# Patient Record
Sex: FEMALE
[De-identification: names, ages, dates, MRNs, and addresses within clinical notes are randomized per-mention and may not be internally consistent; named-entity substitution may affect disease eponyms.]

---

## 2020-08-25 ENCOUNTER — HOSPITAL ENCOUNTER (OUTPATIENT)
Dept: HOSPITAL 5 - OR | Age: 60
Discharge: HOME | End: 2020-08-25
Attending: UROLOGY
Payer: MEDICAID

## 2020-08-25 VITALS — SYSTOLIC BLOOD PRESSURE: 142 MMHG | DIASTOLIC BLOOD PRESSURE: 79 MMHG

## 2020-08-25 DIAGNOSIS — G62.9: ICD-10-CM

## 2020-08-25 DIAGNOSIS — G47.30: ICD-10-CM

## 2020-08-25 DIAGNOSIS — F17.210: ICD-10-CM

## 2020-08-25 DIAGNOSIS — E11.42: ICD-10-CM

## 2020-08-25 DIAGNOSIS — K21.9: ICD-10-CM

## 2020-08-25 DIAGNOSIS — Z86.73: ICD-10-CM

## 2020-08-25 DIAGNOSIS — N32.89: ICD-10-CM

## 2020-08-25 DIAGNOSIS — R31.9: Primary | ICD-10-CM

## 2020-08-25 DIAGNOSIS — Z98.890: ICD-10-CM

## 2020-08-25 DIAGNOSIS — Z90.710: ICD-10-CM

## 2020-08-25 DIAGNOSIS — Z85.828: ICD-10-CM

## 2020-08-25 DIAGNOSIS — Z79.899: ICD-10-CM

## 2020-08-25 DIAGNOSIS — Z80.59: ICD-10-CM

## 2020-08-25 DIAGNOSIS — E78.00: ICD-10-CM

## 2020-08-25 DIAGNOSIS — F32.9: ICD-10-CM

## 2020-08-25 DIAGNOSIS — Z80.3: ICD-10-CM

## 2020-08-25 DIAGNOSIS — I10: ICD-10-CM

## 2020-08-25 DIAGNOSIS — Z80.8: ICD-10-CM

## 2020-08-25 PROCEDURE — C1769 GUIDE WIRE: HCPCS

## 2020-08-25 PROCEDURE — 88305 TISSUE EXAM BY PATHOLOGIST: CPT

## 2020-08-25 PROCEDURE — 84132 ASSAY OF SERUM POTASSIUM: CPT

## 2020-08-25 PROCEDURE — 74176 CT ABD & PELVIS W/O CONTRAST: CPT

## 2020-08-25 PROCEDURE — 74420 UROGRAPHY RTRGR +-KUB: CPT

## 2020-08-25 PROCEDURE — 82962 GLUCOSE BLOOD TEST: CPT

## 2020-08-25 PROCEDURE — 36415 COLL VENOUS BLD VENIPUNCTURE: CPT

## 2020-08-25 PROCEDURE — 88112 CYTOPATH CELL ENHANCE TECH: CPT

## 2020-08-25 PROCEDURE — 52204 CYSTOSCOPY W/BIOPSY(S): CPT

## 2020-08-25 RX ADMIN — HYDROMORPHONE HYDROCHLORIDE PRN MG: 1 INJECTION, SOLUTION INTRAMUSCULAR; INTRAVENOUS; SUBCUTANEOUS at 10:00

## 2020-08-25 RX ADMIN — FENTANYL CITRATE PRN MCG: 50 INJECTION, SOLUTION INTRAMUSCULAR; INTRAVENOUS at 09:35

## 2020-08-25 RX ADMIN — HYDROMORPHONE HYDROCHLORIDE PRN MG: 1 INJECTION, SOLUTION INTRAMUSCULAR; INTRAVENOUS; SUBCUTANEOUS at 09:52

## 2020-08-25 RX ADMIN — FENTANYL CITRATE PRN MCG: 50 INJECTION, SOLUTION INTRAMUSCULAR; INTRAVENOUS at 09:45

## 2020-08-25 NOTE — CAT SCAN REPORT
CT ABDOMEN AND PELVIS WITHOUT CONTRAST



INDICATION / CLINICAL INFORMATION:

hematuria.



TECHNIQUE:

Axial CT images were obtained through the abdomen and pelvis without IV contrast.  All CT scans at [facility] are performed using CT dose reduction for ALARA by means of automated exposure control. 



COMPARISON:

None available.



FINDINGS:



LOWER CHEST: Bibasilar atelectasis and scarring.

LIVER: No significant abnormality.

GALLBLADDER: No significant abnormality.  

BILE DUCTS: No significant abnormality.

PANCREAS: No significant abnormality.

SPLEEN: No significant abnormality.

ADRENALS: Bilateral hypoattenuating adrenal nodules measuring 2.5 cm on the left and 1.8 cm on the ri
ght.

RIGHT KIDNEY / URETER: No significant abnormality. No evidence of nephroureterolithiasis or obstructi
ve uropathy.

LEFT KIDNEY / URETER: No significant abnormality. No evidence of nephroureterolithiasis or obstructiv
e uropathy.



STOMACH / SMALL BOWEL: No significant abnormality. 

COLON: Sigmoid diverticulosis without evidence of inflammation. 

APPENDIX: No significant abnormality.  

PERITONEUM: No free fluid. No free air. No fluid collection.

LYMPH NODES: No significant adenopathy.

AORTA / ARTERIES: Mild atherosclerotic calcification without acute abnormality. 

IVC / VEINS: No significant abnormality.



URINARY BLADDER: Contrast is seen within the bladder along with a single droplet of gas. These findin
gs are likely related to recent intervention.

REPRODUCTIVE ORGANS: No significant abnormality.



ADDITIONAL FINDINGS: None.



SKELETAL SYSTEM: No significant abnormality.



IMPRESSION:

1. No acute abdominopelvic abnormality. Specifically, no evidence of nephroureterolithiasis or obstru
ctive uropathy.

2. Contrast and gas are seen within the urinary bladder lumen from recent urologic intervention.

3. Bilateral hypoattenuating adrenal nodules likely reflect benign adenomas. If there is clinical con
cern, multiphase adrenal protocol CT could be performed.

4. Uncomplicated sigmoid diverticulosis.



Signer Name: Jonathan Germain MD 

Signed: 8/25/2020 11:33 AM

Workstation Name: [id number]

## 2020-08-25 NOTE — POST OPERATIVE NOTE
Date of procedure: 08/25/20


Pre-op diagnosis: hematuria 


Post-op diagnosis: same


Findings: 





neg cysto 


Procedure: 





cysto bx rpgs 


Anesthesia: GETA


Surgeon: EILEEN MUNOZ


Estimated blood loss: none


Pathology: none


Specimen disposition: to lab (bladder)


Condition: stable


Disposition: PACU

## 2020-08-25 NOTE — FLUOROSCOPY REPORT
FL retrograde urography



INDICATION / CLINICAL INFORMATION:

HEMATURIA.



COMPARISON:

None available.



FINDINGS:

Bilateral retrograde examinations performed





Fluoroscopy time: 25 seconds. Fluoroscopic images: 6.



Signer Name: Shree Bender MD 

Signed: 8/25/2020 2:55 PM

Workstation Name: [id number]

## 2020-08-25 NOTE — DISCHARGE SUMMARY
Short Stay Discharge Plan


Activity: other (no straining )


Weight Bearing Status: Full Weight Bearing


Diet: regular, low fat, low cholesterol


Special Instructions: other (inc fluids )


Follow up with: 


COCO HAMILTON MD [Primary Care Provider] - 7 Days


EILEEN MUNOZ MD [Staff Physician] - 14 Days

## 2020-08-25 NOTE — OPERATIVE REPORT
PREOPERATIVE DIAGNOSIS:  Hematuria.



POSTOPERATIVE DIAGNOSIS:  Hematuria.



PROCEDURE:  Cystoscopy, retrograde biopsy.



SURGEON:  Dr. Saini.



ANESTHESIA:  General.



FINDINGS:  This is a woman with hematuria.  She now presents for cystoscopy. 

All risks and implications discussed.



DESCRIPTION OF PROCEDURE:  The patient was brought to the operating room and

placed on the operating table.  Following induction of anesthesia, she was

placed in lithotomy position, prepped and draped in usual sterile fashion.  Exam

was unremarkable.  Bimanual cystoscopy showed no lesions.  Biopsy was done. 

Retrograde showed delicate collecting system with no persistent filling defect. 

The patient tolerated the procedure well and brought to recovery room in stable

condition for a CT scan, which she did not have.  She was supposed to go for,

but she will have it before discharge.





DD: 08/25/2020 08:41

DT: 08/25/2020 08:50

JOB# [id number]  [id number]

BEATRICE/JACKI

## 2020-08-25 NOTE — ANESTHESIA CONSULTATION
Anesthesia Consult and Med Hx


Date of service: 08/25/20





- Airway


Anesthetic Teeth Evaluation: Poor, Chipped


ROM Head & Neck: Adequate


Mental/Hyoid Distance: Adequate


Mallampati Class: Class II


Intubation Access Assessment: Good





- Pulmonary Exam


CTA: Yes





- Cardiac Exam


Cardiac Exam: RRR





- Pre-Operative Health Status


ASA Pre-Surgery Classification: ASA3


Proposed Anesthetic Plan: General





- Pulmonary


Hx Smoking: Yes (1PPD)


Hx Sleep Apnea: Yes





- Cardiovascular System


Hx Hypertension: Yes (SINCE HER 30'S)





- Central Nervous System


CVA: Yes (2005; NO DEFICITS)


Hx Psychiatric Problems: Yes





- Gastrointestinal


Hx Gastroesophageal Reflux Disease: Yes (controlled with med.)





- Endocrine


Hx Non-Insulin Dependent Diabetes: Yes





- Other Systems


Hx Alcohol Use: No


Hx Substance Use: Yes (MARIJUANA)


Hx Cancer: Yes (SKIN BASAL AND SQUAMOUS CARCINOMA)